# Patient Record
Sex: FEMALE | Race: WHITE | Employment: OTHER | URBAN - METROPOLITAN AREA
[De-identification: names, ages, dates, MRNs, and addresses within clinical notes are randomized per-mention and may not be internally consistent; named-entity substitution may affect disease eponyms.]

---

## 2017-01-23 DIAGNOSIS — R56.9 SEIZURE (HCC): Primary | ICD-10-CM

## 2017-01-23 NOTE — TELEPHONE ENCOUNTER
Medication: Topamax     Date of last refill: 10/27/16  [place]    Last office visit: 10/7/16  Due back to clinic per last office note: 6 months   Date next office visit scheduled: None scheduled     Last OV note recom

## 2017-01-25 RX ORDER — TOPIRAMATE 50 MG/1
TABLET, FILM COATED ORAL
Qty: 30 TABLET | Refills: 2 | Status: SHIPPED | OUTPATIENT
Start: 2017-01-25 | End: 2017-04-30

## 2017-03-02 ENCOUNTER — TELEPHONE (OUTPATIENT)
Dept: NEUROLOGY | Facility: CLINIC | Age: 52
End: 2017-03-02

## 2017-03-08 ENCOUNTER — OFFICE VISIT (OUTPATIENT)
Dept: NEUROLOGY | Facility: CLINIC | Age: 52
End: 2017-03-08

## 2017-03-08 VITALS
WEIGHT: 164 LBS | BODY MASS INDEX: 29 KG/M2 | SYSTOLIC BLOOD PRESSURE: 120 MMHG | RESPIRATION RATE: 16 BRPM | HEART RATE: 100 BPM | DIASTOLIC BLOOD PRESSURE: 72 MMHG

## 2017-03-08 DIAGNOSIS — G40.909 SEIZURE DISORDER (HCC): Primary | ICD-10-CM

## 2017-03-08 PROCEDURE — 99213 OFFICE O/P EST LOW 20 MIN: CPT | Performed by: OTHER

## 2017-03-08 RX ORDER — IBUPROFEN 200 MG
200 TABLET ORAL EVERY 6 HOURS PRN
COMMUNITY

## 2017-03-08 RX ORDER — DIVALPROEX SODIUM 125 MG/1
250 CAPSULE, COATED PELLETS ORAL 2 TIMES DAILY
COMMUNITY
Start: 2017-02-21 | End: 2017-05-26

## 2017-03-08 NOTE — PROGRESS NOTES
Pt here for seizure f/u. Reports no seizures since starting medication. Pt here to discuss next steps.

## 2017-03-08 NOTE — PATIENT INSTRUCTIONS
Refill policies:    • Allow 2 business days for refills; controlled substances may take longer.   • Contact your pharmacy at least 5 days prior to running out of medication and have them send an electronic request or submit request through the “request re your physician has recommended that you have a procedure or additional testing performed. [facility]) will contact your insurance carrier to obtain pre-certification or prior authorization.     Unfortunately, [facility] has seen an increas select Option #1 if you have any questions.   Location:  [facility]    [address]  (MOB 2) [address], [address] [facility]

## 2017-03-08 NOTE — PROGRESS NOTES
HPI:    Patient ID: René Mg is a 54year old female. HPI    Raiza Saleh is a 54year old female who presented for follow up for seizure. She had no seizure episodes after the first episode which occured about 2 years ago.  She has been on low dose D pleasant 54year old female   Head: Normocephalic and atraumatic. Neck: Normal range of motion. Neck supple. Cardiovascular: Normal rate, regular rhythm and normal heart sounds.     Pulmonary/Chest: Effort normal and breath sounds normal.   Abdominal: So

## 2017-04-26 DIAGNOSIS — R56.9 SEIZURE (HCC): Primary | ICD-10-CM

## 2017-04-27 RX ORDER — DIVALPROEX SODIUM 125 MG/1
CAPSULE, COATED PELLETS ORAL
Qty: 120 CAPSULE | Refills: 0 | Status: SHIPPED | OUTPATIENT
Start: 2017-04-27 | End: 2017-08-01 | Stop reason: SDUPTHER

## 2017-04-27 NOTE — TELEPHONE ENCOUNTER
Medication: Divalproex 125 mg sprinkles     Date of last refill: 2/21/17  [place]    Last office visit: 3/8/17  Due back to clinic per last office note: PRN  Date next office visit scheduled:  5/3/17    Last OV note r

## 2017-04-30 DIAGNOSIS — R56.9 SEIZURE (HCC): Primary | ICD-10-CM

## 2017-05-01 RX ORDER — TOPIRAMATE 50 MG/1
TABLET, FILM COATED ORAL
Qty: 30 TABLET | Refills: 5 | Status: SHIPPED | OUTPATIENT
Start: 2017-05-01 | End: 2017-11-04

## 2017-05-01 NOTE — TELEPHONE ENCOUNTER
Medication: Topamax    Date of last refill: 1/25/17 for #30/2 additional refills  [place]    Last office visit: 3/8/17  Due back to clinic per last office note:  PRN/1 year  Date next office visit scheduled:  not yet

## 2017-05-23 ENCOUNTER — NURSE ONLY (OUTPATIENT)
Dept: NEUROLOGY | Facility: CLINIC | Age: 52
End: 2017-05-23

## 2017-05-23 DIAGNOSIS — R56.9 SEIZURE (HCC): Primary | ICD-10-CM

## 2017-05-23 DIAGNOSIS — G40.909 SEIZURE DISORDER (HCC): Primary | ICD-10-CM

## 2017-05-23 PROCEDURE — 95819 EEG AWAKE AND ASLEEP: CPT | Performed by: OTHER

## 2017-05-23 NOTE — TELEPHONE ENCOUNTER
Per LOV, mentioned possibly tapering off Depakote. Had EEG today, 5/23/17. Will have to follow up with Dr. Althea Vasquez to if medication can be tapered and if so, will need instructions.  Also, 2 doses of Depakote came over from pharmacy, both for new and older

## 2017-05-26 ENCOUNTER — TELEPHONE (OUTPATIENT)
Dept: NEUROLOGY | Facility: CLINIC | Age: 52
End: 2017-05-26

## 2017-05-26 RX ORDER — DIVALPROEX SODIUM 125 MG/1
CAPSULE, COATED PELLETS ORAL
Qty: 120 CAPSULE | Refills: 1 | OUTPATIENT
Start: 2017-05-26

## 2017-05-26 RX ORDER — DIVALPROEX SODIUM 125 MG/1
CAPSULE, COATED PELLETS ORAL
Qty: 180 CAPSULE | Refills: 1 | Status: SHIPPED | OUTPATIENT
Start: 2017-05-26 | End: 2017-07-31

## 2017-05-26 NOTE — PROCEDURES
ELECTROENCEPHALOGRAM REPORT      Patient Name: Danish Zepeda  Chart ID: [de-identified]  Ordering Physician:   Dr Red Romero                 Date of Test: 5/23/2017    Patient Diagnosis: Seizure      55 Y/O FEMALE PRESENT FOR F/U  EEG FOR SEIZURE [facility] hyperventilation there was 1-2 occurrence of 3-4 HZ large sharp and slow wave activity.       IMPRESSION: This is abnormal EEG due to presence of bursts of generalized sharps and slow waves with bilateral frontal and temporal predominance suggestive of an u

## 2017-05-26 NOTE — TELEPHONE ENCOUNTER
Per Dr Julia Jha: Abnormal EEG . Patient to continue medications. Patient informed. She verbalized understanding.

## 2017-05-30 NOTE — TELEPHONE ENCOUNTER
Patient called back to clarify Depakote instructions. She verbalized understanding and has no further questions or concerns.

## 2017-05-30 NOTE — TELEPHONE ENCOUNTER
5/26/2017  4:23 PM Muqtadar, Lyndee Siren, MD Susan Les[facility] Nurse Rx Response         Comment: Since EEG was positive I change the dose for Depakote to 375 mg BID     Left detailed message for patient on confidential voice mail (OK per HIPAA) relaying message

## 2017-07-31 NOTE — TELEPHONE ENCOUNTER
Medication: Divalproex 125mg    Date of last refill: 5/26/17  [place]     Last office visit: 3/8/17  Due back to clinic per last office note:  prn  Date next office visit scheduled:  No appointment    Last OV note recomm

## 2017-08-01 RX ORDER — DIVALPROEX SODIUM 125 MG/1
CAPSULE, COATED PELLETS ORAL
Qty: 270 CAPSULE | Refills: 0 | Status: SHIPPED | OUTPATIENT
Start: 2017-08-01 | End: 2018-03-07 | Stop reason: ALTCHOICE

## 2017-08-04 ENCOUNTER — TELEPHONE (OUTPATIENT)
Dept: NEUROLOGY | Facility: CLINIC | Age: 52
End: 2017-08-04

## 2017-08-04 NOTE — TELEPHONE ENCOUNTER
Patient voicing concern about being on Depakote long term. Asking if she needs another EEG. Per 3/8/17 OV note per Dr Juanito Rodas:  Ramona Alcantara has been very stable. We will repeat EEG next month and if negative we will taper off Depakote.  5/26/17 EEG still abnor

## 2017-08-04 NOTE — TELEPHONE ENCOUNTER
Per LEEANNE Bowman: She can follow up with Dr. Dedrick Olvera in September or October, who can address her concerns at that time. She should stay on the Depakote for now, as her last EEG was abnormal. (Routing comment)     Patient notified of above.  She verbalized

## 2017-08-18 ENCOUNTER — OFFICE VISIT (OUTPATIENT)
Dept: NEUROLOGY | Facility: CLINIC | Age: 52
End: 2017-08-18

## 2017-08-18 VITALS
BODY MASS INDEX: 31.18 KG/M2 | WEIGHT: 176 LBS | RESPIRATION RATE: 16 BRPM | HEART RATE: 90 BPM | SYSTOLIC BLOOD PRESSURE: 108 MMHG | HEIGHT: 63 IN | DIASTOLIC BLOOD PRESSURE: 74 MMHG

## 2017-08-18 DIAGNOSIS — G40.909 SEIZURE DISORDER (HCC): Primary | ICD-10-CM

## 2017-08-18 PROCEDURE — 99213 OFFICE O/P EST LOW 20 MIN: CPT | Performed by: OTHER

## 2017-08-18 RX ORDER — DIVALPROEX SODIUM 125 MG/1
CAPSULE, COATED PELLETS ORAL
Qty: 270 CAPSULE | Refills: 0 | Status: CANCELLED | OUTPATIENT
Start: 2017-08-18

## 2017-08-18 NOTE — PATIENT INSTRUCTIONS
Refill policies:    • Allow 2-3 business days for refills; controlled substances may take longer.   • Contact your pharmacy at least 5 days prior to running out of medication and have them send an electronic request or submit request through the [facility] have a procedure or additional testing performed. [facility]) will contact your insurance carrier to obtain pre-certification or prior authorization.     Unfortunately, [facility] has seen an increase in denial of payment even though the p

## 2017-08-18 NOTE — PROGRESS NOTES
HPI:    Patient ID: Dottie Sanchez is a 54year old female. HPI      Ivone Giraldo is a 54year old female who presented for follow up for seizure. She had no seizure episodes after the first episode which occured about 2 years ago.  She has been on low dose PHYSICAL EXAM:   Physical Exam      Blood pressure 108/74, pulse 90, resp. rate 16, height 63\", weight 176 lb. Vitals reviewed. General: A pleasant 54year old female   Head: Normocephalic and atraumatic. Neck: Normal range of motion. Neck supple.

## 2017-09-14 DIAGNOSIS — R56.9 SEIZURE (HCC): Primary | ICD-10-CM

## 2017-09-15 ENCOUNTER — TELEPHONE (OUTPATIENT)
Dept: NEUROLOGY | Facility: CLINIC | Age: 52
End: 2017-09-15

## 2017-09-15 RX ORDER — DIVALPROEX SODIUM 125 MG/1
CAPSULE, COATED PELLETS ORAL
Qty: 180 CAPSULE | Refills: 1 | Status: SHIPPED | OUTPATIENT
Start: 2017-09-15 | End: 2017-10-31

## 2017-09-15 NOTE — TELEPHONE ENCOUNTER
Patient asking if she needs to fast for lab work. Advised fasting will provide accurate glucose level, but if chooses not to, to please let lab know. Patient verbalizes understanding.

## 2017-09-25 ENCOUNTER — APPOINTMENT (OUTPATIENT)
Dept: LAB | Age: 52
End: 2017-09-25
Attending: Other
Payer: COMMERCIAL

## 2017-09-25 LAB
ALBUMIN SERPL-MCNC: 3.7 G/DL (ref 3.5–4.8)
ALP LIVER SERPL-CCNC: 51 U/L (ref 39–100)
ALT SERPL-CCNC: 20 U/L (ref 14–54)
AST SERPL-CCNC: 11 U/L (ref 15–41)
BASOPHILS # BLD AUTO: 0.02 X10(3) UL (ref 0–0.1)
BASOPHILS NFR BLD AUTO: 0.4 %
BILIRUB SERPL-MCNC: 0.4 MG/DL (ref 0.1–2)
BUN BLD-MCNC: 13 MG/DL (ref 8–20)
CALCIUM BLD-MCNC: 9.5 MG/DL (ref 8.3–10.3)
CHLORIDE: 108 MMOL/L (ref 101–111)
CO2: 23 MMOL/L (ref 22–32)
CREAT BLD-MCNC: 0.81 MG/DL (ref 0.55–1.02)
EOSINOPHIL # BLD AUTO: 0.18 X10(3) UL (ref 0–0.3)
EOSINOPHIL NFR BLD AUTO: 3.7 %
ERYTHROCYTE [DISTWIDTH] IN BLOOD BY AUTOMATED COUNT: 13.5 % (ref 11.5–16)
GLUCOSE BLD-MCNC: 96 MG/DL (ref 70–99)
HCT VFR BLD AUTO: 36.1 % (ref 34–50)
HGB BLD-MCNC: 12 G/DL (ref 12–16)
IMMATURE GRANULOCYTE COUNT: 0.01 X10(3) UL (ref 0–1)
IMMATURE GRANULOCYTE RATIO %: 0.2 %
LYMPHOCYTES # BLD AUTO: 1.47 X10(3) UL (ref 0.9–4)
LYMPHOCYTES NFR BLD AUTO: 30.6 %
M PROTEIN MFR SERPL ELPH: 7.4 G/DL (ref 6.1–8.3)
MCH RBC QN AUTO: 29.3 PG (ref 27–33.2)
MCHC RBC AUTO-ENTMCNC: 33.2 G/DL (ref 31–37)
MCV RBC AUTO: 88 FL (ref 81–100)
MONOCYTES # BLD AUTO: 0.56 X10(3) UL (ref 0.1–0.6)
MONOCYTES NFR BLD AUTO: 11.6 %
NEUTROPHIL ABS PRELIM: 2.57 X10 (3) UL (ref 1.3–6.7)
NEUTROPHILS # BLD AUTO: 2.57 X10(3) UL (ref 1.3–6.7)
NEUTROPHILS NFR BLD AUTO: 53.5 %
PLATELET # BLD AUTO: 289 10(3)UL (ref 150–450)
POTASSIUM SERPL-SCNC: 4.2 MMOL/L (ref 3.6–5.1)
RBC # BLD AUTO: 4.1 X10(6)UL (ref 3.8–5.1)
RED CELL DISTRIBUTION WIDTH-SD: 43.4 FL (ref 35.1–46.3)
SODIUM SERPL-SCNC: 138 MMOL/L (ref 136–144)
WBC # BLD AUTO: 4.8 X10(3) UL (ref 4–13)

## 2017-09-25 PROCEDURE — 36415 COLL VENOUS BLD VENIPUNCTURE: CPT | Performed by: OTHER

## 2017-09-25 PROCEDURE — 80053 COMPREHEN METABOLIC PANEL: CPT | Performed by: OTHER

## 2017-09-25 PROCEDURE — 85025 COMPLETE CBC W/AUTO DIFF WBC: CPT | Performed by: OTHER

## 2017-09-26 ENCOUNTER — TELEPHONE (OUTPATIENT)
Dept: NEUROLOGY | Facility: CLINIC | Age: 52
End: 2017-09-26

## 2017-10-31 ENCOUNTER — OFFICE VISIT (OUTPATIENT)
Dept: NEUROLOGY | Facility: CLINIC | Age: 52
End: 2017-10-31

## 2017-10-31 VITALS
HEIGHT: 63 IN | BODY MASS INDEX: 31.71 KG/M2 | RESPIRATION RATE: 16 BRPM | WEIGHT: 179 LBS | SYSTOLIC BLOOD PRESSURE: 116 MMHG | DIASTOLIC BLOOD PRESSURE: 74 MMHG | HEART RATE: 96 BPM

## 2017-10-31 DIAGNOSIS — G40.909 SEIZURE DISORDER (HCC): Primary | ICD-10-CM

## 2017-10-31 PROCEDURE — 99213 OFFICE O/P EST LOW 20 MIN: CPT | Performed by: OTHER

## 2017-10-31 NOTE — PROGRESS NOTES
HPI:    Patient ID: Sharee Rodriguez is a 50year old female. Seizures   Pertinent negatives include no headaches. Pretty Alexander is a 54year old female who presented for follow up for seizure.  She had no seizure episodes after the first episode which o pleasant 54year old female   Head: Normocephalic and atraumatic. Neck: Normal range of motion. Neck supple. Cardiovascular: Normal rate, regular rhythm and normal heart sounds.     Pulmonary/Chest: Effort normal and breath sounds normal.   Abdominal: So

## 2017-10-31 NOTE — PATIENT INSTRUCTIONS
Depakote tapering instructions ( Depakote 125 mg capsule)      Take 2 capsules twice daily x 1 week    Then 1 capsule twice daily x 1 week    Then discontinued.  Call the clinic if you experience any suspicious symptoms

## 2017-11-04 DIAGNOSIS — R56.9 SEIZURE (HCC): ICD-10-CM

## 2017-11-06 RX ORDER — TOPIRAMATE 50 MG/1
TABLET, FILM COATED ORAL
Qty: 90 TABLET | Refills: 1 | Status: SHIPPED | OUTPATIENT
Start: 2017-11-06 | End: 2019-04-26

## 2018-03-07 ENCOUNTER — OFFICE VISIT (OUTPATIENT)
Dept: NEUROLOGY | Facility: CLINIC | Age: 53
End: 2018-03-07

## 2018-03-07 VITALS — HEART RATE: 96 BPM | SYSTOLIC BLOOD PRESSURE: 120 MMHG | DIASTOLIC BLOOD PRESSURE: 70 MMHG

## 2018-03-07 DIAGNOSIS — G43.009 MIGRAINE WITHOUT AURA AND WITHOUT STATUS MIGRAINOSUS, NOT INTRACTABLE: ICD-10-CM

## 2018-03-07 DIAGNOSIS — G40.909 SEIZURE DISORDER (HCC): Primary | ICD-10-CM

## 2018-03-07 PROCEDURE — 99213 OFFICE O/P EST LOW 20 MIN: CPT | Performed by: OTHER

## 2018-03-07 NOTE — PROGRESS NOTES
HPI:    Patient ID: Shawnee Brewer is a 50year old female. Seizures   Pertinent negatives include no headaches. Beti Godinez is a 50year old female who presented for follow up. She has history of seizure and migraines.  She clinically did not had an Physical Exam      Blood pressure 120/70, pulse 96. Vitals reviewed. General: A pleasant 54year old female   Head: Normocephalic and atraumatic. Neck: Normal range of motion. Neck supple.   Cardiovascular: Normal rate, regular rhythm and normal heart in this encounter    Imaging & Referrals:  None       YD#[id number]

## 2019-04-26 ENCOUNTER — HOSPITAL ENCOUNTER (OUTPATIENT)
Age: 54
Discharge: HOME | End: 2019-04-26
Attending: FAMILY MEDICINE
Payer: COMMERCIAL

## 2019-04-26 VITALS
RESPIRATION RATE: 16 BRPM | HEIGHT: 63 IN | OXYGEN SATURATION: 100 % | DIASTOLIC BLOOD PRESSURE: 89 MMHG | SYSTOLIC BLOOD PRESSURE: 109 MMHG | HEART RATE: 94 BPM | WEIGHT: 180 LBS | BODY MASS INDEX: 31.89 KG/M2 | TEMPERATURE: 97 F

## 2019-04-26 DIAGNOSIS — R82.90 ABNORMAL URINE FINDING: ICD-10-CM

## 2019-04-26 DIAGNOSIS — L50.8 VIRAL URTICARIA: ICD-10-CM

## 2019-04-26 DIAGNOSIS — A08.4 VIRAL GASTROENTERITIS: Primary | ICD-10-CM

## 2019-04-26 PROCEDURE — 87086 URINE CULTURE/COLONY COUNT: CPT | Performed by: FAMILY MEDICINE

## 2019-04-26 PROCEDURE — 81002 URINALYSIS NONAUTO W/O SCOPE: CPT | Performed by: FAMILY MEDICINE

## 2019-04-26 PROCEDURE — 99204 OFFICE O/P NEW MOD 45 MIN: CPT

## 2019-04-26 PROCEDURE — 85025 COMPLETE CBC W/AUTO DIFF WBC: CPT | Performed by: FAMILY MEDICINE

## 2019-04-26 PROCEDURE — 96360 HYDRATION IV INFUSION INIT: CPT

## 2019-04-26 PROCEDURE — 96361 HYDRATE IV INFUSION ADD-ON: CPT

## 2019-04-26 PROCEDURE — 99215 OFFICE O/P EST HI 40 MIN: CPT

## 2019-04-26 PROCEDURE — 80047 BASIC METABLC PNL IONIZED CA: CPT

## 2019-04-26 PROCEDURE — 80076 HEPATIC FUNCTION PANEL: CPT | Performed by: FAMILY MEDICINE

## 2019-04-26 PROCEDURE — 87081 CULTURE SCREEN ONLY: CPT | Performed by: FAMILY MEDICINE

## 2019-04-26 PROCEDURE — 87430 STREP A AG IA: CPT | Performed by: FAMILY MEDICINE

## 2019-04-26 RX ORDER — ONDANSETRON 4 MG/1
4 TABLET, ORALLY DISINTEGRATING ORAL ONCE
Status: COMPLETED | OUTPATIENT
Start: 2019-04-26 | End: 2019-04-26

## 2019-04-26 RX ORDER — ONDANSETRON 4 MG/1
4 TABLET, ORALLY DISINTEGRATING ORAL EVERY 6 HOURS PRN
Qty: 12 TABLET | Refills: 0 | Status: SHIPPED | OUTPATIENT
Start: 2019-04-26 | End: 2019-04-29

## 2019-04-26 RX ORDER — PREDNISONE 20 MG/1
20 TABLET ORAL DAILY
Qty: 5 TABLET | Refills: 0 | Status: SHIPPED | OUTPATIENT
Start: 2019-04-26 | End: 2019-05-01

## 2019-04-26 RX ORDER — SODIUM CHLORIDE 9 MG/ML
1000 INJECTION, SOLUTION INTRAVENOUS ONCE
Status: COMPLETED | OUTPATIENT
Start: 2019-04-26 | End: 2019-04-26

## 2019-04-26 RX ORDER — CIPROFLOXACIN 500 MG/1
500 TABLET, FILM COATED ORAL 2 TIMES DAILY
Qty: 14 TABLET | Refills: 0 | Status: SHIPPED | OUTPATIENT
Start: 2019-04-26 | End: 2019-05-03

## 2019-04-26 RX ORDER — POTASSIUM CHLORIDE 20 MEQ/1
40 TABLET, EXTENDED RELEASE ORAL ONCE
Status: COMPLETED | OUTPATIENT
Start: 2019-04-26 | End: 2019-04-26

## 2019-04-26 NOTE — ED INITIAL ASSESSMENT (HPI)
Pt sts n/v/d, chills/sweats began yesterday. Emesis x 1 yesterday- none since. Many episodes of diarrhea. Denies abd pain. Sts this morning noted rash to both arms and now has noticed on abd. Itching all over.  Denies SOB, swelling of lips, face, tongue, th

## 2019-04-26 NOTE — ED NOTES
Pt informed of hepatic function result and MD note. Sts has an appt with PMD for Tuesday 4/30/19. Sts will go to ED if symptoms worsen over the weekend.        Notes recorded by Artemio Greer MD on 4/26/2019 at 3:37 PM CDT  Elevated liver enzy

## 2019-04-26 NOTE — ED PROVIDER NOTES
Patient Seen in: [facility] [place]    History   Patient presents with:  Nausea/Vomiting/Diarrhea (gastrointestinal)  Rash Skin Problem (integumentary)    Stated Complaint: TL - Diarrhea    HPI  49 yo F here with complaints of nausea - vomit atraumatic  EENT: OP - wnl, moist, Nares normal  NECK: FROM, supple  LUNGS: CTAB, no RRW  CV: RRR  ABD: hyperactive BS+, no HSM, no guarding or rebound, not distended  NEURO: Alert and oriented to person place and time  GAIT: Normal          ED Course hours as needed. Dispense:  12 tablet          Refill:  0      predniSONE 20 MG Oral Tab          Sig: Take 1 tablet (20 mg total) by mouth daily for 5 days.           Dispense:  5 tablet          Refill:  0      Ciprofloxacin HCl 500 MG Oral Tab

## 2019-05-03 ENCOUNTER — EXTERNAL RECORD (OUTPATIENT)
Dept: OTHER | Age: 54
End: 2019-05-03

## 2019-05-16 ENCOUNTER — TELEPHONE (OUTPATIENT)
Dept: RHEUMATOLOGY | Age: 54
End: 2019-05-16

## 2019-08-10 ENCOUNTER — HOSPITAL ENCOUNTER (OUTPATIENT)
Age: 54
Discharge: HOME | End: 2019-08-10
Attending: FAMILY MEDICINE
Payer: COMMERCIAL

## 2019-08-10 VITALS
OXYGEN SATURATION: 97 % | RESPIRATION RATE: 18 BRPM | SYSTOLIC BLOOD PRESSURE: 126 MMHG | TEMPERATURE: 98 F | DIASTOLIC BLOOD PRESSURE: 84 MMHG | HEART RATE: 118 BPM

## 2019-08-10 DIAGNOSIS — J20.9 ACUTE BRONCHITIS, UNSPECIFIED ORGANISM: Primary | ICD-10-CM

## 2019-08-10 PROCEDURE — 99213 OFFICE O/P EST LOW 20 MIN: CPT

## 2019-08-10 PROCEDURE — 99214 OFFICE O/P EST MOD 30 MIN: CPT

## 2019-08-10 RX ORDER — CEFDINIR 250 MG/5ML
300 POWDER, FOR SUSPENSION ORAL 2 TIMES DAILY
Qty: 84 ML | Refills: 0 | Status: SHIPPED | OUTPATIENT
Start: 2019-08-10 | End: 2019-08-17

## 2019-08-10 RX ORDER — PREDNISONE 10 MG/1
10 TABLET ORAL
COMMUNITY
End: 2021-11-17

## 2019-08-10 RX ORDER — HYDROXYCHLOROQUINE SULFATE 200 MG/1
TABLET, FILM COATED ORAL
Refills: 3 | COMMUNITY
Start: 2019-07-18

## 2019-08-10 RX ORDER — PANTOPRAZOLE SODIUM 40 MG/1
40 TABLET, DELAYED RELEASE ORAL
COMMUNITY
Start: 2019-06-07

## 2019-08-10 NOTE — ED PROVIDER NOTES
[place] [address] [facility] [place]   Patient presents with:  Cough/URI    Stated Complaint: Sore Throat, congestion    HPI    32-year-old  female presents to the immediate care today with chest congestion, cough, phlegm.   Charlie Aldridge gums normal  Neck: no adenopathy, supple, no bruits  Chest wall: No rib tenderness. No crepitus. Lungs: rhonchi heard in both lung fields. No wheezing  or crackles. No accessory muscle use. No respiratory distress. No tachypnea noted.  No retractions not

## 2019-08-28 ENCOUNTER — HOSPITAL ENCOUNTER (OUTPATIENT)
Age: 54
Discharge: HOME | End: 2019-08-28
Attending: FAMILY MEDICINE
Payer: COMMERCIAL

## 2019-08-28 VITALS
HEART RATE: 103 BPM | RESPIRATION RATE: 16 BRPM | TEMPERATURE: 99 F | SYSTOLIC BLOOD PRESSURE: 121 MMHG | DIASTOLIC BLOOD PRESSURE: 78 MMHG | OXYGEN SATURATION: 98 %

## 2019-08-28 DIAGNOSIS — J40 BRONCHITIS: ICD-10-CM

## 2019-08-28 DIAGNOSIS — J45.909 MILD REACTIVE AIRWAYS DISEASE, UNSPECIFIED WHETHER PERSISTENT: ICD-10-CM

## 2019-08-28 DIAGNOSIS — J01.00 ACUTE MAXILLARY SINUSITIS, RECURRENCE NOT SPECIFIED: Primary | ICD-10-CM

## 2019-08-28 PROCEDURE — 94664 DEMO&/EVAL PT USE INHALER: CPT

## 2019-08-28 PROCEDURE — 99213 OFFICE O/P EST LOW 20 MIN: CPT

## 2019-08-28 PROCEDURE — 99214 OFFICE O/P EST MOD 30 MIN: CPT

## 2019-08-28 RX ORDER — ALBUTEROL SULFATE 90 UG/1
2 AEROSOL, METERED RESPIRATORY (INHALATION) EVERY 4 HOURS PRN
Qty: 1 INHALER | Refills: 0 | Status: SHIPPED | OUTPATIENT
Start: 2019-08-28 | End: 2019-09-27

## 2019-08-28 RX ORDER — CEFDINIR 250 MG/5ML
300 POWDER, FOR SUSPENSION ORAL 2 TIMES DAILY
Qty: 120 ML | Refills: 0 | Status: SHIPPED | OUTPATIENT
Start: 2019-08-28 | End: 2019-09-07

## 2019-08-28 NOTE — ED PROVIDER NOTES
Patient Seen in: [address] [facility] [place]    History   Patient presents with:  Cough/URI  Sore Throat    Stated Complaint: cough    HPI  This is a 24-year-old female coming in with complaints of cough, nasal congestion and sore throat that began s distress, making good conversation, answering appropriately   SKIN: No pallor, no erythema, no cyanosis, warm and dry  Eyes: wnl, normal conjunctiva   HEAD: Normocephalic, atraumatic  EENT: OP - wnl, moist, erythematous OP, TMs - middle ear effusion+, Nare are using your albuterol inhaler more than prescribed or not relieving shortness of breath or wheezing, please go to the emergency room and contact your primary care physician.           Disposition and Plan     Clinical Impression:  Acute maxillary sinusit

## 2019-08-28 NOTE — ED INITIAL ASSESSMENT (HPI)
Pt sts cough, nasal congestion, and sore throat began several days ago. Feeling tired. No known fever. Cough is productive at times. Denies SOB. Sts seen here 2 weeks ago- symptoms completely resolved.

## 2019-11-18 ENCOUNTER — APPOINTMENT (OUTPATIENT)
Dept: DERMATOLOGY | Age: 54
End: 2019-11-18

## 2019-11-25 ENCOUNTER — OFFICE VISIT (OUTPATIENT)
Dept: DERMATOLOGY | Age: 54
End: 2019-11-25

## 2019-11-25 DIAGNOSIS — Z12.83 SCREENING EXAM FOR SKIN CANCER: Primary | ICD-10-CM

## 2019-11-25 DIAGNOSIS — L82.1 SEBORRHEIC KERATOSIS: ICD-10-CM

## 2019-11-25 DIAGNOSIS — D48.9 NEOPLASM OF UNCERTAIN BEHAVIOR: ICD-10-CM

## 2019-11-25 PROCEDURE — 99203 OFFICE O/P NEW LOW 30 MIN: CPT | Performed by: PHYSICIAN ASSISTANT

## 2019-11-25 PROCEDURE — 11102 TANGNTL BX SKIN SINGLE LES: CPT | Performed by: PHYSICIAN ASSISTANT

## 2019-11-25 RX ORDER — IBUPROFEN 200 MG
200 TABLET ORAL
COMMUNITY

## 2019-11-25 RX ORDER — HYDROXYCHLOROQUINE SULFATE 200 MG/1
400 TABLET, FILM COATED ORAL
COMMUNITY
Start: 2019-07-18

## 2019-11-25 RX ORDER — PANTOPRAZOLE SODIUM 40 MG/1
40 TABLET, DELAYED RELEASE ORAL
COMMUNITY
Start: 2019-06-07

## 2019-11-25 RX ORDER — PREDNISONE 10 MG/1
10 TABLET ORAL
COMMUNITY

## 2019-11-27 LAB — PATH REPORT PLASRBC-IMP: NORMAL

## 2021-09-15 ENCOUNTER — LAB SERVICES (OUTPATIENT)
Dept: URGENT CARE | Age: 56
End: 2021-09-15

## 2021-09-15 DIAGNOSIS — Z11.52 ENCOUNTER FOR SCREENING LABORATORY TESTING FOR COVID-19 VIRUS IN ASYMPTOMATIC PATIENT: Primary | ICD-10-CM

## 2021-09-15 DIAGNOSIS — Z01.812 ENCOUNTER FOR SCREENING LABORATORY TESTING FOR COVID-19 VIRUS IN ASYMPTOMATIC PATIENT: Primary | ICD-10-CM

## 2021-09-15 PROCEDURE — U0003 INFECTIOUS AGENT DETECTION BY NUCLEIC ACID (DNA OR RNA); SEVERE ACUTE RESPIRATORY SYNDROME CORONAVIRUS 2 (SARS-COV-2) (CORONAVIRUS DISEASE [COVID-19]), AMPLIFIED PROBE TECHNIQUE, MAKING USE OF HIGH THROUGHPUT TECHNOLOGIES AS DESCRIBED BY CMS-2020-01-R: HCPCS | Performed by: INTERNAL MEDICINE

## 2021-09-15 PROCEDURE — U0005 INFEC AGEN DETEC AMPLI PROBE: HCPCS | Performed by: INTERNAL MEDICINE

## 2021-09-15 PROCEDURE — X1094 NO CHARGE VISIT: HCPCS | Performed by: NURSE PRACTITIONER

## 2021-09-16 LAB
SARS-COV-2 RNA RESP QL NAA+PROBE: NOT DETECTED
SERVICE CMNT-IMP: NORMAL
SERVICE CMNT-IMP: NORMAL

## 2021-10-18 ENCOUNTER — HOSPITAL ENCOUNTER (OUTPATIENT)
Age: 56
Discharge: HOME | End: 2021-10-18
Payer: COMMERCIAL

## 2021-10-18 VITALS
SYSTOLIC BLOOD PRESSURE: 103 MMHG | TEMPERATURE: 98 F | DIASTOLIC BLOOD PRESSURE: 58 MMHG | OXYGEN SATURATION: 98 % | HEART RATE: 73 BPM | RESPIRATION RATE: 16 BRPM

## 2021-10-18 DIAGNOSIS — J06.9 VIRAL URI: Primary | ICD-10-CM

## 2021-10-18 PROCEDURE — U0002 COVID-19 LAB TEST NON-CDC: HCPCS | Performed by: NURSE PRACTITIONER

## 2021-10-18 PROCEDURE — 99213 OFFICE O/P EST LOW 20 MIN: CPT | Performed by: NURSE PRACTITIONER

## 2021-10-18 PROCEDURE — 87880 STREP A ASSAY W/OPTIC: CPT | Performed by: NURSE PRACTITIONER

## 2021-10-18 NOTE — ED PROVIDER NOTES
Patient Seen in: Immediate [address] [place] [address]      History   Patient presents with:  Testing    Stated Complaint: ear pain sore throat    Subjective: This is a 61-year-old female with below stated medical history.   Presents to immediate care for sore throat, Neurological: Negative for headaches. Hematological: Does not bruise/bleed easily. Positive for stated complaint: ear pain sore throat  Other systems are as noted in HPI. Constitutional and vital signs reviewed.       All other systems reviewed a Musculoskeletal:      Cervical back: Neck supple. Right lower leg: No edema. Left lower leg: No edema. Skin:     Capillary Refill: Capillary refill takes less than 2 seconds. Findings: No rash.    Neurological:      Mental Status: She is

## 2021-11-17 ENCOUNTER — OFFICE VISIT (OUTPATIENT)
Dept: NEUROLOGY | Facility: CLINIC | Age: 56
End: 2021-11-17
Payer: COMMERCIAL

## 2021-11-17 VITALS
OXYGEN SATURATION: 99 % | SYSTOLIC BLOOD PRESSURE: 98 MMHG | BODY MASS INDEX: 29.06 KG/M2 | WEIGHT: 164 LBS | RESPIRATION RATE: 16 BRPM | DIASTOLIC BLOOD PRESSURE: 66 MMHG | HEART RATE: 80 BPM | HEIGHT: 63 IN

## 2021-11-17 DIAGNOSIS — R56.9 SEIZURES (HCC): Primary | ICD-10-CM

## 2021-11-17 PROCEDURE — 99203 OFFICE O/P NEW LOW 30 MIN: CPT | Performed by: HOSPITALIST

## 2021-11-17 PROCEDURE — 3008F BODY MASS INDEX DOCD: CPT | Performed by: HOSPITALIST

## 2021-11-17 PROCEDURE — 3074F SYST BP LT 130 MM HG: CPT | Performed by: HOSPITALIST

## 2021-11-17 PROCEDURE — 3078F DIAST BP <80 MM HG: CPT | Performed by: HOSPITALIST

## 2021-11-17 RX ORDER — HYDROXYZINE HYDROCHLORIDE 25 MG/1
TABLET, FILM COATED ORAL
COMMUNITY
Start: 2021-10-07

## 2021-11-17 RX ORDER — RIBOFLAVIN (VITAMIN B2) 400 MG
400 TABLET ORAL DAILY
Qty: 60 TABLET | Refills: 3 | Status: SHIPPED | OUTPATIENT
Start: 2021-11-17 | End: 2021-12-17

## 2021-11-17 RX ORDER — SERTRALINE HYDROCHLORIDE 25 MG/1
25 TABLET, FILM COATED ORAL DAILY
COMMUNITY
Start: 2021-10-07

## 2021-11-17 NOTE — PROGRESS NOTES
New patient for Seizures- Patient states she had one seizure in 2015 but has been seizure free since then. Patient is not currently on any seizure medications.

## 2021-11-17 NOTE — H&P
Neurology H&P    Danish Zepeda Patient Status:  No patient class for patient encounter    1965 MRN [id number]   Location ED [facility] Attending No att. providers found   Hosp Day # 0 PCP PHILIP Segura     Subjective:  Clelia Soulier 15 mg by mouth once a week. • hydrOXYzine 25 MG Oral Tab Take 1 tablet as needed for anxiousness and inability to sleep     • Riboflavin 400 MG Oral Tab Take 400 mg by mouth daily.  60 tablet 3   • Pantoprazole Sodium 40 MG Oral Tab EC Take 40 mg by medhat point ROS completed and was negative, except for pertinent positive and negatives stated in subjective. Objective/Physical Exam:    Vital Signs:  Blood pressure 98/66, pulse 80, resp. rate 16, height 63\", weight 164 lb (74.4 kg), SpO2 99 %.     Gen: Anamaria on above      Alvarenga MD Howard

## 2022-03-18 ENCOUNTER — NURSE ONLY (OUTPATIENT)
Dept: ELECTROPHYSIOLOGY | Facility: HOSPITAL | Age: 57
End: 2022-03-18
Attending: HOSPITALIST
Payer: COMMERCIAL

## 2022-03-18 DIAGNOSIS — R56.9 SEIZURES (HCC): ICD-10-CM

## 2022-03-18 DIAGNOSIS — R41.82 ALTERED MENTAL STATUS, UNSPECIFIED ALTERED MENTAL STATUS TYPE: ICD-10-CM

## 2022-03-18 PROCEDURE — 95819 EEG AWAKE AND ASLEEP: CPT | Performed by: HOSPITALIST

## 2022-03-31 ENCOUNTER — TELEPHONE (OUTPATIENT)
Dept: NEUROLOGY | Facility: CLINIC | Age: 57
End: 2022-03-31

## 2022-03-31 NOTE — TELEPHONE ENCOUNTER
Patient would like to know if her test results have been read by Provider yet. Please contact to further discuss and advise.

## 2022-03-31 NOTE — PROCEDURES
[facility] - ELECTROENCEPHALOGRAM (EEG) REPORT  Patient Name:  Louis Reyes   MRN / CSN:  [de-identified] / [id number]   Date of Birth / Age:  8/19/1965 /  49year old   Encounter Date:  3/18/2022         METHODS:  Twenty-two electrodes were applied according to the 10-20-electrode placement system on this routine audio-video EEG. EKG monitoring, monopolar and bipolar montages are routinely utilized. The record was obtained on a digital system. OBJECT:  This is a 49year old year-old female with history of epilepsy    The EEG was requested to assess for epileptiform activity and change in mental status. State(s) of consciousness: Awake, drowsy, sleep    Relevant medications:   sertraline 25 MG Oral Tab, Take 25 mg by mouth daily. , Disp: , Rfl:   methotrexate 2.5 MG Oral Tab, Take 15 mg by mouth once a week., Disp: , Rfl:   hydrOXYzine 25 MG Oral Tab, Take 1 tablet as needed for anxiousness and inability to sleep, Disp: , Rfl:   Pantoprazole Sodium 40 MG Oral Tab EC, Take 40 mg by mouth., Disp: , Rfl:   metoprolol Tartrate 25 MG Oral Tab, Take 12.5 mg by mouth., Disp: , Rfl:   Hydroxychloroquine Sulfate 200 MG Oral Tab, TK 2 TS PO ONCE A DAY, Disp: , Rfl: 3  cetirizine 10 MG Oral Tab, TK 1 T PO QD, Disp: , Rfl: 0  triamcinolone acetonide 0.1 % External Ointment, Apply to arms and legs bid, Disp: 453 g, Rfl: 1  hydrocortisone 2.5 % External Cream, Apply 1 Application topically 2 (two) times daily. Apply every morning and evening. Apply to face, Disp: 60 g, Rfl: 1  ibuprofen 200 MG Oral Tab, Take 200 mg by mouth every 6 (six) hours as needed for Pain., Disp: , Rfl:   Cholecalciferol (VITAMIN D) 2000 UNITS Oral Cap, Take  by mouth., Disp: , Rfl:     No current facility-administered medications on file prior to visit. FINDINGS:  During relative maximal wakefulness there was a well-developed bilateral posterior dominant rhythm between 9.5 to 10.5 Hz (50-70 uV) that appeared symmetric and reactive.   General background in this state largely consisted of alpha frequencies with an AP gradient. Transition to drowsiness and sleep characterized by symmetric background slowing. During sleep spike and  spike and wave discharges were seen on occasion bifrontally. No clear ictal events captured. Background was reactive throughout the recording. Hyperventilation was performed. Did see a burst of broad bifrontal spike discharges during hyperventilation. IMPRESSION:  This was an abnormal routine EEG in what appeared to be the awake, drowsy, and asleep states. Findings are consistent with a generalized seizure tendency. No clear seizures captured.       SIGNATURES:  Jude Espinoza MD   [facility]

## 2022-04-14 ENCOUNTER — HOSPITAL ENCOUNTER (EMERGENCY)
Age: 57
Discharge: HOME | End: 2022-04-14
Attending: EMERGENCY MEDICINE
Payer: COMMERCIAL

## 2022-04-14 VITALS
HEART RATE: 98 BPM | SYSTOLIC BLOOD PRESSURE: 146 MMHG | HEIGHT: 63 IN | RESPIRATION RATE: 14 BRPM | BODY MASS INDEX: 30.12 KG/M2 | DIASTOLIC BLOOD PRESSURE: 73 MMHG | OXYGEN SATURATION: 97 % | TEMPERATURE: 98 F | WEIGHT: 170 LBS

## 2022-04-14 DIAGNOSIS — R51.9 NONINTRACTABLE EPISODIC HEADACHE, UNSPECIFIED HEADACHE TYPE: Primary | ICD-10-CM

## 2022-04-14 LAB
ANION GAP SERPL CALC-SCNC: 7 MMOL/L (ref 0–18)
BASOPHILS # BLD AUTO: 0.02 X10(3) UL (ref 0–0.2)
BASOPHILS NFR BLD AUTO: 0.4 %
BUN BLD-MCNC: 14 MG/DL (ref 7–18)
CALCIUM BLD-MCNC: 8.9 MG/DL (ref 8.5–10.1)
CHLORIDE SERPL-SCNC: 106 MMOL/L (ref 98–112)
CO2 SERPL-SCNC: 25 MMOL/L (ref 21–32)
CREAT BLD-MCNC: 0.88 MG/DL
EOSINOPHIL # BLD AUTO: 0.18 X10(3) UL (ref 0–0.7)
EOSINOPHIL NFR BLD AUTO: 3.7 %
ERYTHROCYTE [DISTWIDTH] IN BLOOD BY AUTOMATED COUNT: 14.4 %
GLUCOSE BLD-MCNC: 121 MG/DL (ref 70–99)
HCT VFR BLD AUTO: 35.6 %
HGB BLD-MCNC: 11.5 G/DL
IMM GRANULOCYTES # BLD AUTO: 0.01 X10(3) UL (ref 0–1)
IMM GRANULOCYTES NFR BLD: 0.2 %
LYMPHOCYTES # BLD AUTO: 1.55 X10(3) UL (ref 1–4)
LYMPHOCYTES NFR BLD AUTO: 31.4 %
MCH RBC QN AUTO: 28.7 PG (ref 26–34)
MCHC RBC AUTO-ENTMCNC: 32.3 G/DL (ref 31–37)
MCV RBC AUTO: 88.8 FL
MONOCYTES # BLD AUTO: 0.5 X10(3) UL (ref 0.1–1)
MONOCYTES NFR BLD AUTO: 10.1 %
NEUTROPHILS # BLD AUTO: 2.67 X10 (3) UL (ref 1.5–7.7)
NEUTROPHILS # BLD AUTO: 2.67 X10(3) UL (ref 1.5–7.7)
NEUTROPHILS NFR BLD AUTO: 54.2 %
OSMOLALITY SERPL CALC.SUM OF ELEC: 288 MOSM/KG (ref 275–295)
PLATELET # BLD AUTO: 311 10(3)UL (ref 150–450)
POTASSIUM SERPL-SCNC: 3.9 MMOL/L (ref 3.5–5.1)
RBC # BLD AUTO: 4.01 X10(6)UL
SODIUM SERPL-SCNC: 138 MMOL/L (ref 136–145)
WBC # BLD AUTO: 4.9 X10(3) UL (ref 4–11)

## 2022-04-14 PROCEDURE — 96360 HYDRATION IV INFUSION INIT: CPT

## 2022-04-14 PROCEDURE — 85025 COMPLETE CBC W/AUTO DIFF WBC: CPT | Performed by: EMERGENCY MEDICINE

## 2022-04-14 PROCEDURE — 99284 EMERGENCY DEPT VISIT MOD MDM: CPT

## 2022-04-14 PROCEDURE — 80048 BASIC METABOLIC PNL TOTAL CA: CPT | Performed by: EMERGENCY MEDICINE

## 2022-04-14 NOTE — ED INITIAL ASSESSMENT (HPI)
PT to the ED for evaluation of intermittent headaches for 2 months, constant since last night. Pt states she took one 325mg dose of tylenol yesterday, but got no relief. PT states that in 2015 she had a seizure \"from stress\" and she wants to be sure that this headache isn't a sign that she is going to have another seizure. PT denies diagnosis of seizure disoder and has not been on depakote since 2018.

## 2022-04-18 ENCOUNTER — HOSPITAL ENCOUNTER (EMERGENCY)
Facility: HOSPITAL | Age: 57
Discharge: HOME | End: 2022-04-18
Attending: EMERGENCY MEDICINE
Payer: COMMERCIAL

## 2022-04-18 VITALS
RESPIRATION RATE: 16 BRPM | WEIGHT: 170 LBS | OXYGEN SATURATION: 98 % | HEART RATE: 107 BPM | HEIGHT: 63 IN | DIASTOLIC BLOOD PRESSURE: 76 MMHG | SYSTOLIC BLOOD PRESSURE: 110 MMHG | BODY MASS INDEX: 30.12 KG/M2 | TEMPERATURE: 98 F

## 2022-04-18 DIAGNOSIS — R51.9 RECURRENT HEADACHE: Primary | ICD-10-CM

## 2022-04-18 LAB — CARBAMAZEPINE SERPL-MCNC: 3.2 UG/ML (ref 4–12)

## 2022-04-18 PROCEDURE — 96375 TX/PRO/DX INJ NEW DRUG ADDON: CPT

## 2022-04-18 PROCEDURE — 99284 EMERGENCY DEPT VISIT MOD MDM: CPT

## 2022-04-18 PROCEDURE — 96374 THER/PROPH/DIAG INJ IV PUSH: CPT

## 2022-04-18 PROCEDURE — 80156 ASSAY CARBAMAZEPINE TOTAL: CPT | Performed by: EMERGENCY MEDICINE

## 2022-04-18 RX ORDER — DIPHENHYDRAMINE HYDROCHLORIDE 50 MG/ML
25 INJECTION INTRAMUSCULAR; INTRAVENOUS ONCE
Status: DISCONTINUED | OUTPATIENT
Start: 2022-04-18 | End: 2022-04-18

## 2022-04-18 RX ORDER — KETOROLAC TROMETHAMINE 30 MG/ML
15 INJECTION, SOLUTION INTRAMUSCULAR; INTRAVENOUS ONCE
Status: COMPLETED | OUTPATIENT
Start: 2022-04-18 | End: 2022-04-18

## 2022-04-18 RX ORDER — METOCLOPRAMIDE HYDROCHLORIDE 5 MG/ML
10 INJECTION INTRAMUSCULAR; INTRAVENOUS ONCE
Status: COMPLETED | OUTPATIENT
Start: 2022-04-18 | End: 2022-04-18

## 2022-04-18 RX ORDER — DIPHENHYDRAMINE HYDROCHLORIDE 50 MG/ML
25 INJECTION INTRAMUSCULAR; INTRAVENOUS ONCE
Status: COMPLETED | OUTPATIENT
Start: 2022-04-18 | End: 2022-04-18

## 2022-04-18 RX ORDER — DEXAMETHASONE SODIUM PHOSPHATE 10 MG/ML
10 INJECTION, SOLUTION INTRAMUSCULAR; INTRAVENOUS ONCE
Status: COMPLETED | OUTPATIENT
Start: 2022-04-18 | End: 2022-04-18

## 2022-04-18 NOTE — ED INITIAL ASSESSMENT (HPI)
Pt is awake and alert, reports being started on carbmezipine Thursday, has \"crying\" seizures, with abnormal seizure test, has had headache at top of head for 1 week, no releif from motrin,

## 2022-07-06 ENCOUNTER — OFFICE VISIT (OUTPATIENT)
Dept: NEUROLOGY | Facility: CLINIC | Age: 57
End: 2022-07-06
Payer: COMMERCIAL

## 2022-07-06 VITALS
DIASTOLIC BLOOD PRESSURE: 70 MMHG | BODY MASS INDEX: 30.12 KG/M2 | OXYGEN SATURATION: 97 % | RESPIRATION RATE: 16 BRPM | SYSTOLIC BLOOD PRESSURE: 100 MMHG | WEIGHT: 170 LBS | HEART RATE: 109 BPM | HEIGHT: 63 IN

## 2022-07-06 DIAGNOSIS — R56.9 SEIZURES (HCC): Primary | ICD-10-CM

## 2022-07-06 PROCEDURE — 3074F SYST BP LT 130 MM HG: CPT | Performed by: HOSPITALIST

## 2022-07-06 PROCEDURE — 3008F BODY MASS INDEX DOCD: CPT | Performed by: HOSPITALIST

## 2022-07-06 PROCEDURE — 99214 OFFICE O/P EST MOD 30 MIN: CPT | Performed by: HOSPITALIST

## 2022-07-06 PROCEDURE — 3078F DIAST BP <80 MM HG: CPT | Performed by: HOSPITALIST

## 2022-07-06 RX ORDER — CARBAMAZEPINE 100 MG/1
TABLET, EXTENDED RELEASE ORAL
Qty: 180 TABLET | Refills: 11 | Status: SHIPPED | OUTPATIENT
Start: 2022-07-06

## 2022-07-31 ENCOUNTER — HOSPITAL ENCOUNTER (OUTPATIENT)
Age: 57
Discharge: HOME | End: 2022-07-31
Payer: COMMERCIAL

## 2022-07-31 VITALS
HEIGHT: 63 IN | DIASTOLIC BLOOD PRESSURE: 74 MMHG | RESPIRATION RATE: 20 BRPM | BODY MASS INDEX: 30.12 KG/M2 | SYSTOLIC BLOOD PRESSURE: 122 MMHG | WEIGHT: 170 LBS | OXYGEN SATURATION: 98 % | HEART RATE: 107 BPM | TEMPERATURE: 98 F

## 2022-07-31 DIAGNOSIS — H10.31 ACUTE CONJUNCTIVITIS OF RIGHT EYE, UNSPECIFIED ACUTE CONJUNCTIVITIS TYPE: Primary | ICD-10-CM

## 2022-07-31 DIAGNOSIS — L08.9 FOOT INFECTION: ICD-10-CM

## 2022-07-31 PROCEDURE — 99213 OFFICE O/P EST LOW 20 MIN: CPT | Performed by: NURSE PRACTITIONER

## 2022-07-31 RX ORDER — CEPHALEXIN 500 MG/1
500 CAPSULE ORAL 3 TIMES DAILY
Qty: 30 CAPSULE | Refills: 0 | Status: SHIPPED | OUTPATIENT
Start: 2022-07-31 | End: 2022-08-10

## 2022-07-31 RX ORDER — POLYMYXIN B SULFATE AND TRIMETHOPRIM 1; 10000 MG/ML; [USP'U]/ML
1 SOLUTION OPHTHALMIC
Qty: 10 ML | Refills: 0 | Status: SHIPPED | OUTPATIENT
Start: 2022-07-31 | End: 2022-08-05

## 2022-07-31 RX ORDER — CLOTRIMAZOLE 1 %
1 CREAM (GRAM) TOPICAL 2 TIMES DAILY
Qty: 40 G | Refills: 0 | Status: SHIPPED | OUTPATIENT
Start: 2022-07-31 | End: 2022-08-14

## 2022-07-31 NOTE — ED INITIAL ASSESSMENT (HPI)
Pt with c/o right eye redness, discharge and itching that started yesterday. Also c/o rash to foot x1 week.   Pt using cortisone without relief

## 2022-12-20 ENCOUNTER — HOSPITAL ENCOUNTER (OUTPATIENT)
Age: 57
Discharge: HOME | End: 2022-12-20
Payer: COMMERCIAL

## 2022-12-20 VITALS
DIASTOLIC BLOOD PRESSURE: 85 MMHG | HEIGHT: 63 IN | HEART RATE: 105 BPM | TEMPERATURE: 99 F | BODY MASS INDEX: 30.12 KG/M2 | RESPIRATION RATE: 18 BRPM | SYSTOLIC BLOOD PRESSURE: 121 MMHG | WEIGHT: 170 LBS | OXYGEN SATURATION: 96 %

## 2022-12-20 DIAGNOSIS — B34.9 VIRAL SYNDROME: Primary | ICD-10-CM

## 2022-12-20 LAB
S PYO AG THROAT QL: NEGATIVE
SARS-COV-2 RNA RESP QL NAA+PROBE: NOT DETECTED

## 2022-12-20 PROCEDURE — 99203 OFFICE O/P NEW LOW 30 MIN: CPT | Performed by: PHYSICIAN ASSISTANT

## 2022-12-20 PROCEDURE — U0002 COVID-19 LAB TEST NON-CDC: HCPCS | Performed by: NURSE PRACTITIONER

## 2022-12-20 PROCEDURE — 87880 STREP A ASSAY W/OPTIC: CPT | Performed by: PHYSICIAN ASSISTANT

## 2022-12-28 ENCOUNTER — HOSPITAL ENCOUNTER (OUTPATIENT)
Age: 57
Discharge: HOME | End: 2022-12-28
Payer: COMMERCIAL

## 2022-12-28 VITALS
RESPIRATION RATE: 16 BRPM | HEART RATE: 99 BPM | OXYGEN SATURATION: 98 % | DIASTOLIC BLOOD PRESSURE: 91 MMHG | TEMPERATURE: 98 F | SYSTOLIC BLOOD PRESSURE: 126 MMHG

## 2022-12-28 DIAGNOSIS — J01.00 ACUTE NON-RECURRENT MAXILLARY SINUSITIS: Primary | ICD-10-CM

## 2022-12-28 PROCEDURE — 99213 OFFICE O/P EST LOW 20 MIN: CPT | Performed by: PHYSICIAN ASSISTANT

## 2023-06-11 ENCOUNTER — HOSPITAL ENCOUNTER (OUTPATIENT)
Age: 58
Discharge: HOME | End: 2023-06-11
Payer: COMMERCIAL

## 2023-06-11 VITALS
BODY MASS INDEX: 31.36 KG/M2 | RESPIRATION RATE: 18 BRPM | SYSTOLIC BLOOD PRESSURE: 118 MMHG | WEIGHT: 177 LBS | TEMPERATURE: 98 F | OXYGEN SATURATION: 98 % | HEART RATE: 99 BPM | HEIGHT: 63 IN | DIASTOLIC BLOOD PRESSURE: 74 MMHG

## 2023-06-11 DIAGNOSIS — H10.32 ACUTE CONJUNCTIVITIS OF LEFT EYE, UNSPECIFIED ACUTE CONJUNCTIVITIS TYPE: Primary | ICD-10-CM

## 2023-06-11 PROCEDURE — 99213 OFFICE O/P EST LOW 20 MIN: CPT | Performed by: NURSE PRACTITIONER

## 2023-06-11 RX ORDER — TOBRAMYCIN 3 MG/ML
2 SOLUTION/ DROPS OPHTHALMIC
Qty: 1 EACH | Refills: 0 | Status: SHIPPED | OUTPATIENT
Start: 2023-06-11 | End: 2023-06-16

## 2023-06-11 NOTE — ED INITIAL ASSESSMENT (HPI)
x2 days Pt c/o left eye itch, slight pain, watering, redness    Denies: photophobia, colored discharge, vision issues.

## 2023-07-07 ENCOUNTER — HOSPITAL ENCOUNTER (OUTPATIENT)
Age: 58
Discharge: HOME | End: 2023-07-07
Payer: COMMERCIAL

## 2023-07-07 VITALS
RESPIRATION RATE: 18 BRPM | TEMPERATURE: 98 F | BODY MASS INDEX: 30.12 KG/M2 | DIASTOLIC BLOOD PRESSURE: 78 MMHG | OXYGEN SATURATION: 96 % | HEART RATE: 80 BPM | WEIGHT: 170 LBS | SYSTOLIC BLOOD PRESSURE: 126 MMHG | HEIGHT: 63 IN

## 2023-07-07 DIAGNOSIS — G43.809 OTHER MIGRAINE WITHOUT STATUS MIGRAINOSUS, NOT INTRACTABLE: Primary | ICD-10-CM

## 2023-07-07 DIAGNOSIS — R09.82 PND (POST-NASAL DRIP): ICD-10-CM

## 2023-07-07 LAB — S PYO AG THROAT QL: NEGATIVE

## 2023-07-07 RX ORDER — SODIUM CHLORIDE 9 MG/ML
1000 INJECTION, SOLUTION INTRAVENOUS ONCE
Status: COMPLETED | OUTPATIENT
Start: 2023-07-07 | End: 2023-07-07

## 2023-07-07 RX ORDER — KETOROLAC TROMETHAMINE 30 MG/ML
30 INJECTION, SOLUTION INTRAMUSCULAR; INTRAVENOUS ONCE
Status: COMPLETED | OUTPATIENT
Start: 2023-07-07 | End: 2023-07-07

## 2023-07-07 RX ORDER — NAPROXEN 500 MG/1
500 TABLET ORAL 2 TIMES DAILY PRN
Qty: 14 TABLET | Refills: 0 | Status: SHIPPED | OUTPATIENT
Start: 2023-07-07 | End: 2023-07-14

## 2023-07-07 RX ORDER — ONDANSETRON 2 MG/ML
4 INJECTION INTRAMUSCULAR; INTRAVENOUS ONCE
Status: COMPLETED | OUTPATIENT
Start: 2023-07-07 | End: 2023-07-07

## 2023-07-07 NOTE — ED QUICK NOTES
Patient lying in exam room with music playing on her phone. States he headache is improving. Will continue to monitor.

## 2023-07-27 ENCOUNTER — OFFICE VISIT (OUTPATIENT)
Dept: NEUROLOGY | Facility: CLINIC | Age: 58
End: 2023-07-27
Payer: COMMERCIAL

## 2023-07-27 VITALS
WEIGHT: 170 LBS | SYSTOLIC BLOOD PRESSURE: 128 MMHG | BODY MASS INDEX: 30 KG/M2 | DIASTOLIC BLOOD PRESSURE: 62 MMHG | HEART RATE: 91 BPM | RESPIRATION RATE: 16 BRPM

## 2023-07-27 DIAGNOSIS — G40.909 SEIZURE DISORDER (HCC): Primary | ICD-10-CM

## 2023-07-27 PROCEDURE — 3074F SYST BP LT 130 MM HG: CPT | Performed by: OTHER

## 2023-07-27 PROCEDURE — 3078F DIAST BP <80 MM HG: CPT | Performed by: OTHER

## 2023-07-27 PROCEDURE — 99214 OFFICE O/P EST MOD 30 MIN: CPT | Performed by: OTHER

## 2023-07-27 RX ORDER — CARBAMAZEPINE 100 MG/1
TABLET, EXTENDED RELEASE ORAL
Qty: 270 TABLET | Refills: 3 | Status: SHIPPED | OUTPATIENT
Start: 2023-07-27

## 2023-07-27 RX ORDER — LORAZEPAM 0.5 MG/1
1 TABLET ORAL 2 TIMES DAILY PRN
COMMUNITY
Start: 2023-07-11

## 2023-08-04 ENCOUNTER — HOSPITAL ENCOUNTER (OUTPATIENT)
Age: 58
Discharge: HOME | End: 2023-08-04
Payer: COMMERCIAL

## 2023-08-04 VITALS
TEMPERATURE: 98 F | DIASTOLIC BLOOD PRESSURE: 77 MMHG | SYSTOLIC BLOOD PRESSURE: 109 MMHG | OXYGEN SATURATION: 97 % | HEART RATE: 98 BPM | RESPIRATION RATE: 18 BRPM

## 2023-08-04 DIAGNOSIS — R21 RASH: Primary | ICD-10-CM

## 2023-08-04 LAB — T PALLIDUM AB SER QL IA: NONREACTIVE

## 2023-08-04 PROCEDURE — 99213 OFFICE O/P EST LOW 20 MIN: CPT | Performed by: NURSE PRACTITIONER

## 2023-08-04 PROCEDURE — 86780 TREPONEMA PALLIDUM: CPT | Performed by: NURSE PRACTITIONER

## 2023-08-04 RX ORDER — PERMETHRIN 50 MG/G
1 CREAM TOPICAL ONCE
Qty: 1 EACH | Refills: 0 | Status: SHIPPED | OUTPATIENT
Start: 2023-08-04 | End: 2023-08-04

## 2023-08-04 NOTE — DISCHARGE INSTRUCTIONS
Use the steroid cream as directed after you use the cream application. Close follow-up with dermatology.

## 2023-10-06 ENCOUNTER — HOSPITAL ENCOUNTER (OUTPATIENT)
Age: 58
Discharge: HOME | End: 2023-10-06
Payer: COMMERCIAL

## 2023-10-06 VITALS
RESPIRATION RATE: 18 BRPM | SYSTOLIC BLOOD PRESSURE: 122 MMHG | BODY MASS INDEX: 30.3 KG/M2 | WEIGHT: 171 LBS | OXYGEN SATURATION: 99 % | TEMPERATURE: 98 F | HEIGHT: 63 IN | DIASTOLIC BLOOD PRESSURE: 84 MMHG | HEART RATE: 91 BPM

## 2023-10-06 DIAGNOSIS — J06.9 UPPER RESPIRATORY TRACT INFECTION, UNSPECIFIED TYPE: ICD-10-CM

## 2023-10-06 DIAGNOSIS — R09.81 NASAL CONGESTION: Primary | ICD-10-CM

## 2023-10-06 LAB
S PYO AG THROAT QL: NEGATIVE
SARS-COV-2 RNA RESP QL NAA+PROBE: NOT DETECTED

## 2023-10-06 RX ORDER — ALBUTEROL SULFATE 90 UG/1
2 AEROSOL, METERED RESPIRATORY (INHALATION) EVERY 4 HOURS PRN
Qty: 1 EACH | Refills: 0 | Status: SHIPPED | OUTPATIENT
Start: 2023-10-06 | End: 2023-11-05

## 2024-06-20 ENCOUNTER — APPOINTMENT (OUTPATIENT)
Dept: GENERAL RADIOLOGY | Age: 59
End: 2024-06-20
Attending: PHYSICIAN ASSISTANT

## 2024-06-20 ENCOUNTER — HOSPITAL ENCOUNTER (OUTPATIENT)
Age: 59
Discharge: HOME | End: 2024-06-20

## 2024-06-20 VITALS
TEMPERATURE: 97 F | WEIGHT: 169 LBS | RESPIRATION RATE: 16 BRPM | HEART RATE: 96 BPM | DIASTOLIC BLOOD PRESSURE: 79 MMHG | BODY MASS INDEX: 29.95 KG/M2 | OXYGEN SATURATION: 98 % | HEIGHT: 63 IN | SYSTOLIC BLOOD PRESSURE: 122 MMHG

## 2024-06-20 DIAGNOSIS — S93.402A SPRAIN OF LEFT ANKLE, UNSPECIFIED LIGAMENT, INITIAL ENCOUNTER: ICD-10-CM
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
## 2024-06-20 DIAGNOSIS — S43.402A SPRAIN OF LEFT SHOULDER, UNSPECIFIED SHOULDER SPRAIN TYPE, INITIAL ENCOUNTER: ICD-10-CM

## 2024-06-20 PROCEDURE — 73030 X-RAY EXAM OF SHOULDER: CPT | Performed by: PHYSICIAN ASSISTANT

## 2024-06-20 PROCEDURE — 73610 X-RAY EXAM OF ANKLE: CPT | Performed by: PHYSICIAN ASSISTANT

## 2024-06-20 PROCEDURE — 99214 OFFICE O/P EST MOD 30 MIN: CPT | Performed by: PHYSICIAN ASSISTANT

## 2024-06-20 PROCEDURE — 73130 X-RAY EXAM OF HAND: CPT | Performed by: PHYSICIAN ASSISTANT

## 2024-06-20 RX ORDER — NAPROXEN 500 MG/1
500 TABLET ORAL 2 TIMES DAILY PRN
Qty: 14 TABLET | Refills: 0 | Status: SHIPPED | OUTPATIENT
Start: 2024-06-20 | End: 2024-06-27

## 2024-06-20 RX ORDER — CYCLOBENZAPRINE HCL 10 MG
10 TABLET ORAL NIGHTLY PRN
Qty: 10 TABLET | Refills: 0 | Status: SHIPPED | OUTPATIENT
Start: 2024-06-20 | End: 2024-06-30
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
## 2025-02-24 ENCOUNTER — HOSPITAL ENCOUNTER (OUTPATIENT)
Age: 60
Discharge: HOME | End: 2025-02-24
Payer: COMMERCIAL

## 2025-02-24 VITALS
OXYGEN SATURATION: 95 % | TEMPERATURE: 99 F | DIASTOLIC BLOOD PRESSURE: 84 MMHG | SYSTOLIC BLOOD PRESSURE: 119 MMHG | HEART RATE: 94 BPM | RESPIRATION RATE: 18 BRPM

## 2025-02-24 DIAGNOSIS — J02.9 SORE THROAT: ICD-10-CM

## 2025-02-24 DIAGNOSIS — L28.2 PRURITIC RASH: Primary | ICD-10-CM

## 2025-02-24 LAB — S PYO AG THROAT QL: NEGATIVE

## 2025-02-24 PROCEDURE — 99214 OFFICE O/P EST MOD 30 MIN: CPT | Performed by: NURSE PRACTITIONER

## 2025-02-24 PROCEDURE — 87880 STREP A ASSAY W/OPTIC: CPT | Performed by: NURSE PRACTITIONER

## 2025-02-24 RX ORDER — HYDROXYZINE PAMOATE 25 MG/1
25 CAPSULE ORAL 3 TIMES DAILY PRN
Qty: 20 CAPSULE | Refills: 0 | Status: SHIPPED | OUTPATIENT
Start: 2025-02-24

## 2025-02-24 RX ORDER — METHYLPREDNISOLONE 4 MG/1
TABLET ORAL
Qty: 1 EACH | Refills: 0 | Status: SHIPPED | OUTPATIENT
Start: 2025-02-24

## 2025-02-24 RX ORDER — TRIAMCINOLONE ACETONIDE 1 MG/G
1 CREAM TOPICAL 2 TIMES DAILY
Qty: 15 G | Refills: 0 | Status: SHIPPED | OUTPATIENT
Start: 2025-02-24

## 2025-02-25 NOTE — ED PROVIDER NOTES
Patient Seen in: [facility]      History     Chief Complaint   Patient presents with    Sore Throat    Rash Skin Problem     Stated Complaint: Weakness Sore Throat Rash    Subjective:   HPI  Patient is a pleasant 59-year-old female with seizure disorder and rheumatoid arthritis here for evaluation of pruritic rash and sore throat.  Symptoms started about the same time, about 1 week ago.  Patient feels symptoms have been intermittent.    Rash intermittently for about 1 week.  Feels like it worsened over the past day.    No new soaps, laundry detergents, fabric softener.      Tried benadryl. Itching worsens at night.        Objective:     Past Medical History:    Anxiety    Depression    Gastric paresis    Pericarditis (HCC)    Pneumonia    Hospitalized for 1 week    RA (rheumatoid arthritis) (HCC)    Rheumatoid arthritis (HCC)    Seizure disorder (HCC)    1 episode. Pt sts due to low potassium.               Past Surgical History:   Procedure Laterality Date    Oophorectomy      Other  Mary 2019    \"Open window\" to drain fluid around heart.    Removal of kidney stone      Up gi endoscopy,remv tumor,forceps                  Social History     Socioeconomic History    Marital status:    Tobacco Use    Smoking status: Never    Smokeless tobacco: Never   Vaping Use    Vaping status: Never Used   Substance and Sexual Activity    Alcohol use: No     Alcohol/week: 0.0 standard drinks of alcohol     Comment: very little     Drug use: No   Other Topics Concern    Caffeine Concern Yes     Comment: occasional     Exercise Yes     Comment: walking, dancing, working out x3/week     Social Drivers of Health     Food Insecurity: Low Risk  (5/11/2023)    Received from [facility], [facility]    Food Insecurity     Have there been times that your food ran out, and you didn't have money to get more?: No     Are there times that you worry that this might happen?: No    Transportation Needs: Low Risk  (5/11/2023)    Received from [facility]    Transportation Needs     Do you have trouble getting transportation to medical appointments?: No   Housing Stability: High Risk (5/11/2023)    Received from [facility]    Housing Stability     Are you concerned about having a safe and reliable place to live?: Yes              Review of Systems    Positive for stated complaint: Weakness Sore Throat Rash  Other systems are as noted in HPI.  Constitutional and vital signs reviewed.      All other systems reviewed and negative except as noted above.    Physical Exam     ED Triage Vitals [02/24/25 1905]   /84   Pulse 94   Resp 18   Temp 98.7 °F (37.1 °C)   Temp src Oral   SpO2 95 %   O2 Device None (Room air)       Current Vitals:   Vital Signs  BP: 119/84  Pulse: 94  Resp: 18  Temp: 98.7 °F (37.1 °C)  Temp src: Oral    Oxygen Therapy  SpO2: 95 %  O2 Device: None (Room air)        Physical Exam  Vitals and nursing note reviewed.   Constitutional:       General: She is not in acute distress.     Appearance: She is well-developed. She is not ill-appearing, toxic-appearing or diaphoretic.   HENT:      Nose: No congestion.      Mouth/Throat:      Mouth: Mucous membranes are moist. No oral lesions.      Pharynx: Uvula midline. Pharyngeal swelling and posterior oropharyngeal erythema present. No oropharyngeal exudate or uvula swelling.      Tonsils: No tonsillar exudate or tonsillar abscesses.   Eyes:      Conjunctiva/sclera: Conjunctivae normal.      Pupils: Pupils are equal, round, and reactive to light.   Cardiovascular:      Rate and Rhythm: Normal rate and regular rhythm.   Lymphadenopathy:      Cervical: No cervical adenopathy.   Skin:     Comments: Erythemic pruritic areas on left anterior neck, chin and bilateral cheeks.   Neurological:      Mental Status: She is alert.            ED Course     Labs Reviewed   POCT RAPID STREP - Normal                 MDM              Medical Decision Making  Differentials include but are not limited to urticaria, viral urticaria, contact dermatitis and strep.  Negative rapid strep testing here.  Patient is well-appearing, afebrile with no visible exudate or lymphadenopathy.  Will start Medrol Dosepak in the morning, encouraged use of nondrowsy antihistamine during the day, Vistaril at bedtime and topical triamcinolone.  Close outpatient follow-up with dermatology for recurrent rashes.  Patient agrees with plan of care.  All questions answered to patient satisfaction    Amount and/or Complexity of Data Reviewed  Labs: ordered. Decision-making details documented in ED Course.        Disposition and Plan     Clinical Impression:  1. Pruritic rash    2. Sore throat         Disposition:  Discharge  2/24/2025  7:25 pm    Follow-up:  Nonstaff, Physician      As needed    [address] [id number] [phone]  Schedule an appointment as soon as possible for a visit   for intermittent rashes          Medications Prescribed:  Discharge Medication List as of 2/24/2025  7:35 PM        START taking these medications    Details   triamcinolone 0.1 % External Cream Apply 1 each topically 2 (two) times daily., Normal, Disp-15 g, R-0      hydrOXYzine Pamoate 25 MG Oral Cap Take 1 capsule (25 mg total) by mouth 3 (three) times daily as needed for Itching (at bedtime)., Normal, Disp-20 capsule, R-0      methylPREDNISolone (MEDROL) 4 MG Oral Tablet Therapy Pack Dosepack: take as directed, Normal, Disp-1 each, R-0                 Supplementary Documentation:

## 2025-05-05 ENCOUNTER — HOSPITAL ENCOUNTER (OUTPATIENT)
Age: 60
Discharge: HOME | End: 2025-05-05
Payer: COMMERCIAL

## 2025-05-05 VITALS
DIASTOLIC BLOOD PRESSURE: 60 MMHG | SYSTOLIC BLOOD PRESSURE: 98 MMHG | WEIGHT: 160 LBS | TEMPERATURE: 98 F | BODY MASS INDEX: 28 KG/M2 | OXYGEN SATURATION: 99 % | HEART RATE: 89 BPM | RESPIRATION RATE: 18 BRPM

## 2025-05-05 DIAGNOSIS — U07.1 COVID-19: Primary | ICD-10-CM

## 2025-05-05 LAB
POCT INFLUENZA A: NEGATIVE
POCT INFLUENZA B: NEGATIVE
S PYO AG THROAT QL: NEGATIVE
SARS-COV-2 RNA RESP QL NAA+PROBE: DETECTED

## 2025-05-05 PROCEDURE — 87880 STREP A ASSAY W/OPTIC: CPT | Performed by: NURSE PRACTITIONER

## 2025-05-05 PROCEDURE — 99214 OFFICE O/P EST MOD 30 MIN: CPT | Performed by: NURSE PRACTITIONER

## 2025-05-05 PROCEDURE — 87502 INFLUENZA DNA AMP PROBE: CPT | Performed by: NURSE PRACTITIONER

## 2025-05-05 PROCEDURE — U0002 COVID-19 LAB TEST NON-CDC: HCPCS | Performed by: NURSE PRACTITIONER

## 2025-05-05 NOTE — ED INITIAL ASSESSMENT (HPI)
Patient here with c/o feeling sick since Friday. Symptoms include sore throat, congestion and cough. No recent fever, chills or body aches.

## 2025-05-05 NOTE — ED PROVIDER NOTES
Patient Seen in: [facility]      History     Chief Complaint   Patient presents with    Ear Problem     Stated Complaint: Earache; Cough; Sniffling    Subjective:   59-year-old female presents today with URI symptoms ear pain and cough.  Symptoms over the last 4 to 5 days.  Denies any fever chills.  No body exstrophy.  Alert oriented x 3.  No other symptoms or concerns.  The patient's medication list, past medical history and social history elements as listed in today's nurse's notes were reviewed and agreed (except as otherwise stated in the HPI).  The patient's family history reviewed and determined to be noncontributory to the presenting problem          History of Present Illness               Objective:     Past Medical History:    Anxiety    Depression    Gastric paresis    Pericarditis (HCC)    Pneumonia    Hospitalized for 1 week    RA (rheumatoid arthritis) (HCC)    Rheumatoid arthritis (HCC)    Seizure disorder (HCC)    1 episode. Pt sts due to low potassium.               Past Surgical History:   Procedure Laterality Date    Oophorectomy      Other  Mary 2019    \"Open window\" to drain fluid around heart.    Removal of kidney stone      Up gi endoscopy,remv tumor,forceps                  No pertinent social history.            Review of Systems    Positive for stated complaint: Earache; Cough; Sniffling  Other systems are as noted in HPI.  Constitutional and vital signs reviewed.      All other systems reviewed and negative except as noted above.                  Physical Exam     ED Triage Vitals [05/05/25 1437]   BP 98/60   Pulse 89   Resp 18   Temp 98.2 °F (36.8 °C)   Temp src Oral   SpO2 99 %   O2 Device None (Room air)       Current Vitals:   Vital Signs  BP: 98/60  Pulse: 89  Resp: 18  Temp: 98.2 °F (36.8 °C)  Temp src: Oral    Oxygen Therapy  SpO2: 99 %  O2 Device: None (Room air)        Physical Exam  Vitals and nursing note reviewed.   Constitutional:       Appearance: She is  well-developed.   HENT:      Head: Normocephalic.      Right Ear: Tympanic membrane and ear canal normal.      Left Ear: Tympanic membrane and ear canal normal.      Nose: Mucosal edema and congestion present.      Mouth/Throat:      Pharynx: Uvula midline. Posterior oropharyngeal erythema present.   Eyes:      Conjunctiva/sclera: Conjunctivae normal.      Pupils: Pupils are equal, round, and reactive to light.   Cardiovascular:      Rate and Rhythm: Normal rate and regular rhythm.   Pulmonary:      Effort: Pulmonary effort is normal.      Breath sounds: Normal breath sounds.   Musculoskeletal:      Cervical back: Normal range of motion and neck supple.   Skin:     General: Skin is warm and dry.   Neurological:      Mental Status: She is alert and oriented to person, place, and time.           Physical Exam                ED Course     Labs Reviewed   RAPID SARS-COV-2 BY PCR - Abnormal; Notable for the following components:       Result Value    Rapid SARS-CoV-2 by PCR Detected (*)     All other components within normal limits   POCT RAPID STREP - Normal   POCT FLU TEST - Normal    Narrative:     This assay is a rapid molecular in vitro test utilizing nucleic acid amplification of influenza A and B viral RNA.          Results                           MDM     Please note that this report has been produced using speech recognition software and may contain errors related to that system including, but not limited to, errors in grammar, punctuation, and spelling, as well as words and phrases that possibly may have been recognized inappropriately.  If there are any questions or concerns, contact the dictating provider for clarification.              Medical Decision Making  Differential diagnosis includes but is not limited to: COVID-19, viral URI, strep throat, influenza, pneumonia, sinusitis, bronchitis      Presented today with right symptoms cough and sore throat.  Rapid strep and flu testing were done and negative..   COVID-19 testing was done and was positive.  To remain quarantined symptoms started to improve, if anytime develops a fever must be fever free for 24 hours with symptoms improving before leaving quarantine.  Encouraged to push fluids rest.  To take Tylenol for any fever pain.  Take over-the-counter antihistamine cough suppressant as needed.   To follow-up with primary MD 14 days after quarantine ends for reevaluation.  Patient verbalized understanding agree with plan of care.      Amount and/or Complexity of Data Reviewed  Labs: ordered. Decision-making details documented in ED Course.     Details: COVID-19  Influenza  Strep    Risk  OTC drugs.        Disposition and Plan     Clinical Impression:  1. COVID-19         Disposition:  Discharge  5/5/2025  3:11 pm    Follow-up:  Nonstaff, Physician    In 1 week  As needed          Medications Prescribed:  Discharge Medication List as of 5/5/2025  3:13 PM          Supplementary Documentation:

## 2025-05-14 ENCOUNTER — HOSPITAL ENCOUNTER (OUTPATIENT)
Age: 60
Discharge: HOME | End: 2025-05-14
Payer: COMMERCIAL

## 2025-05-14 VITALS
DIASTOLIC BLOOD PRESSURE: 82 MMHG | TEMPERATURE: 98 F | RESPIRATION RATE: 16 BRPM | SYSTOLIC BLOOD PRESSURE: 116 MMHG | HEIGHT: 63 IN | OXYGEN SATURATION: 99 % | WEIGHT: 106 LBS | BODY MASS INDEX: 18.78 KG/M2 | HEART RATE: 91 BPM

## 2025-05-14 DIAGNOSIS — Z76.89 RETURN TO WORK EVALUATION: Primary | ICD-10-CM

## 2025-05-14 LAB — SARS-COV-2 RNA RESP QL NAA+PROBE: NOT DETECTED

## 2025-05-14 PROCEDURE — 99212 OFFICE O/P EST SF 10 MIN: CPT | Performed by: NURSE PRACTITIONER

## 2025-05-14 PROCEDURE — U0002 COVID-19 LAB TEST NON-CDC: HCPCS | Performed by: NURSE PRACTITIONER

## 2025-05-14 NOTE — ED INITIAL ASSESSMENT (HPI)
Patient requesting CoVid test. Reports being positive 5/5. Had been feeling better when she developed symptoms again yesterday. 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

## 2025-05-14 NOTE — DISCHARGE INSTRUCTIONS
Continue take over-the-counter antihistamine for congestion runny nose.  Wear mask when around your patient's.  Follow-up with your primary care physician as needed.  COVID testing was negative, congestion could be allergies or a new virus.

## 2025-05-14 NOTE — ED PROVIDER NOTES
Patient Seen in: [facility]      History     Chief Complaint   Patient presents with    Cough/URI     Stated Complaint: Sinus Issues    Subjective:   59-year-old female presents today with history of COVID-19 was diagnosed on 5 May.  Still continues to have URI symptoms but otherwise feeling much better.  Here today for repeat COVID testing to ensure that COVID has resolved.  Denies any shortness of breath or wheezing.  No other symptoms or concerns.  The patient's medication list, past medical history and social history elements as listed in today's nurse's notes were reviewed and agreed (except as otherwise stated in the HPI).  The patient's family history reviewed and determined to be noncontributory to the presenting problem      History of Present Illness                  Objective:     Past Medical History:    Anxiety    Depression    Gastric paresis    Pericarditis (HCC)    Pneumonia    Hospitalized for 1 week    RA (rheumatoid arthritis) (HCC)    Rheumatoid arthritis (HCC)    Seizure disorder (HCC)    1 episode. Pt sts due to low potassium.               Past Surgical History:   Procedure Laterality Date    Oophorectomy      Other  Mary 2019    \"Open window\" to drain fluid around heart.    Removal of kidney stone      Up gi endoscopy,remv tumor,forceps                  Social History     Socioeconomic History    Marital status:    Tobacco Use    Smoking status: Never    Smokeless tobacco: Never   Vaping Use    Vaping status: Never Used   Substance and Sexual Activity    Alcohol use: No     Alcohol/week: 0.0 standard drinks of alcohol     Comment: very little     Drug use: No   Other Topics Concern    Caffeine Concern Yes     Comment: occasional     Exercise Yes     Comment: walking, dancing, working out x3/week     Social Drivers of Health     Food Insecurity: Low Risk  (5/11/2023)    Received from [facility]    Food Insecurity     Have there been times that your  food ran out, and you didn't have money to get more?: No     Are there times that you worry that this might happen?: No   Transportation Needs: Low Risk  (5/11/2023)    Received from [facility]    Transportation Needs     Do you have trouble getting transportation to medical appointments?: No   Housing Stability: High Risk (5/11/2023)    Received from [facility]    Housing Stability     Are you concerned about having a safe and reliable place to live?: Yes              Review of Systems    Positive for stated complaint: Sinus Issues  Other systems are as noted in HPI.  Constitutional and vital signs reviewed.      All other systems reviewed and negative except as noted above.                  Physical Exam     ED Triage Vitals [05/14/25 0812]   /82   Pulse 91   Resp 16   Temp 98.2 °F (36.8 °C)   Temp src Oral   SpO2 99 %   O2 Device None (Room air)       Current Vitals:   Vital Signs  BP: 116/82  Pulse: 91  Resp: 16  Temp: 98.2 °F (36.8 °C)  Temp src: Oral    Oxygen Therapy  SpO2: 99 %  O2 Device: None (Room air)          Physical Exam  Vitals and nursing note reviewed.   Constitutional:       Appearance: Normal appearance.   HENT:      Head: Normocephalic.      Nose: Congestion present.      Mouth/Throat:      Mouth: Mucous membranes are moist.   Eyes:      Pupils: Pupils are equal, round, and reactive to light.   Cardiovascular:      Rate and Rhythm: Normal rate.   Pulmonary:      Effort: Pulmonary effort is normal.      Breath sounds: Normal breath sounds.   Musculoskeletal:      Cervical back: Normal range of motion and neck supple.   Skin:     General: Skin is warm and dry.   Neurological:      Mental Status: She is alert and oriented to person, place, and time.                   ED Course     Labs Reviewed   RAPID SARS-COV-2 BY PCR - Normal          Results                                MDM   Please note that this report has been produced using speech  recognition software and may contain errors related to that system including, but not limited to, errors in grammar, punctuation, and spelling, as well as words and phrases that possibly may have been recognized inappropriately.  If there are any questions or concerns, contact the dictating provider for clarification.              Medical Decision Making  Differential diagnosis includes but is not limited to: COVID-19, viral URI, strep throat, influenza, pneumonia, sinusitis, bronchitis        Presented today with ongoing URI symptoms however symptoms have improved.  States that he wanted repeat COVID test to return to work.  COVID testing was done and negative.  Explained to patient could be secondary to viral URI versus possible allergies.  To wear mask if having to go to work.  Otherwise take over-the-counter antihistamine for symptoms.  Patient verbalized understanding and agreed to plan of care.    Amount and/or Complexity of Data Reviewed  Labs: ordered. Decision-making details documented in ED Course.     Details:  COVID-19    Risk  OTC drugs.          Disposition and Plan     Clinical Impression:  1. Return to work evaluation         Disposition:  Discharge  5/14/2025  8:33 am    Follow-up:  Nonstaff, Physician      As needed          Medications Prescribed:  Current Discharge Medication List                Supplementary Documentation:

## 2025-07-11 ENCOUNTER — HOSPITAL ENCOUNTER (OUTPATIENT)
Age: 60
Discharge: HOME | End: 2025-07-11
Payer: COMMERCIAL

## 2025-07-11 ENCOUNTER — APPOINTMENT (OUTPATIENT)
Dept: GENERAL RADIOLOGY | Age: 60
End: 2025-07-11
Attending: NURSE PRACTITIONER
Payer: COMMERCIAL

## 2025-07-11 VITALS
SYSTOLIC BLOOD PRESSURE: 118 MMHG | RESPIRATION RATE: 18 BRPM | OXYGEN SATURATION: 98 % | HEART RATE: 90 BPM | DIASTOLIC BLOOD PRESSURE: 72 MMHG | TEMPERATURE: 99 F

## 2025-07-11 DIAGNOSIS — M25.562 PAIN AND SWELLING OF LEFT KNEE: Primary | ICD-10-CM

## 2025-07-11 DIAGNOSIS — M25.462 PAIN AND SWELLING OF LEFT KNEE: Primary | ICD-10-CM

## 2025-07-11 PROCEDURE — 99214 OFFICE O/P EST MOD 30 MIN: CPT | Performed by: NURSE PRACTITIONER

## 2025-07-11 PROCEDURE — A6449 LT COMPRES BAND >=3" <5"/YD: HCPCS | Performed by: NURSE PRACTITIONER

## 2025-07-11 PROCEDURE — 73560 X-RAY EXAM OF KNEE 1 OR 2: CPT | Performed by: NURSE PRACTITIONER

## 2025-07-11 RX ORDER — PREDNISONE 20 MG/1
40 TABLET ORAL DAILY
Qty: 10 TABLET | Refills: 0 | Status: SHIPPED | OUTPATIENT
Start: 2025-07-11 | End: 2025-07-16

## 2025-07-11 NOTE — ED PROVIDER NOTES
Patient Seen in: [facility]        History  Chief Complaint   Patient presents with    Knee Pain     Stated Complaint: left knee pain    Subjective:   59-year-old female, who presents with nontraumatic pain of the left knee as well as swelling.  States it has been there for 2 days.  States he is also get a shot in her knee in a couple weeks.                      Objective:     Past Medical History:    Anxiety    Depression    Gastric paresis    Pericarditis (HCC)    Pneumonia    Hospitalized for 1 week    RA (rheumatoid arthritis) (HCC)    Rheumatoid arthritis (HCC)    Seizure disorder (HCC)    1 episode. Pt sts due to low potassium.               Past Surgical History:   Procedure Laterality Date    Oophorectomy      Other  Mary 2019    \"Open window\" to drain fluid around heart.    Removal of kidney stone      Up gi endoscopy,remv tumor,forceps                  Social History     Socioeconomic History    Marital status:    Tobacco Use    Smoking status: Never    Smokeless tobacco: Never   Vaping Use    Vaping status: Never Used   Substance and Sexual Activity    Alcohol use: No     Alcohol/week: 0.0 standard drinks of alcohol     Comment: very little     Drug use: No   Other Topics Concern    Caffeine Concern Yes     Comment: occasional     Exercise Yes     Comment: walking, dancing, working out x3/week     Social Drivers of Health     Food Insecurity: Low Risk  (5/11/2023)    Received from [facility]    Food Insecurity     Have there been times that your food ran out, and you didn't have money to get more?: No     Are there times that you worry that this might happen?: No   Transportation Needs: Low Risk  (5/11/2023)    Received from [facility]    Transportation Needs     Do you have trouble getting transportation to medical appointments?: No   Housing Stability: High Risk (5/11/2023)    Received from [facility]    Housing  Stability     Are you concerned about having a safe and reliable place to live?: Yes              Review of Systems   Constitutional: Negative.    Musculoskeletal:         Left knee pain and swelling   All other systems reviewed and are negative.      Positive for stated complaint: left knee pain  Other systems are as noted in HPI.  Constitutional and vital signs reviewed.      All other systems reviewed and negative except as noted above.                  Physical Exam    ED Triage Vitals [07/11/25 1811]   /72   Pulse 90   Resp 18   Temp 98.8 °F (37.1 °C)   Temp src Oral   SpO2 98 %   O2 Device None (Room air)       Current Vitals:   Vital Signs  BP: 118/72  Pulse: 90  Resp: 18  Temp: 98.8 °F (37.1 °C)  Temp src: Oral    Oxygen Therapy  SpO2: 98 %  O2 Device: None (Room air)            Physical Exam  Vitals and nursing note reviewed.   Constitutional:       General: She is not in acute distress.     Appearance: Normal appearance. She is not ill-appearing, toxic-appearing or diaphoretic.   Musculoskeletal:      Left knee: Swelling present. No deformity or bony tenderness. Normal range of motion. Tenderness present over the patellar tendon.      Left lower leg: Normal.   Skin:     General: Skin is warm and dry.      Capillary Refill: Capillary refill takes less than 2 seconds.      Coloration: Skin is not jaundiced or pale.   Neurological:      Mental Status: She is alert and oriented to person, place, and time.   Psychiatric:         Behavior: Behavior normal.                 ED Course  Labs Reviewed - No data to display  XR KNEE (1 OR 2 VIEWS), LEFT (CPT=73560)  Result Date: 7/11/2025  PROCEDURE: XR KNEE (1 OR 2 VIEWS), LEFT (CPT=73560) INDICATIONS: left knee pain PATIENT STATED HISTORY: Left knee pain and swelling for two days. No injury or trauma. Patient has a history of RA. COMPARISON: No comparisons. FINDINGS: No fracture or dislocation. Marked medial joint space narrowing. Tricompartmental marginal  osteophytes.     CONCLUSION: Tricompartmental osteoarthritic changes, greatest medially. Electronically Verified and Signed by Attending Radiologist: Rudy Rahman MD 7/11/2025 6:51 PM Workstation: [id number]                           [facility]             Medical Decision Making  Differential diagnosis initially included but was not limited to: Knee effusion, sprain, fracture    Nontoxic 9-year-old female with nontraumatic pain of the left knee.  No vascular deficits.    I personally viewed, independently interpreted and radiology report was reviewed.  Fracture, no specific findings for an effusion.  Osteoarthritic changes.  Will provide with Ace wrap.  Try a course of prednisone.  She will follow-up with her orthopedist.    Supportive/home management of diagnosis/illness/injury discussed. Patient and/or responsible adult verbalize and agree with management and plan of care.    Speech recognition software was used during this dictation.  There may be minor errors in transcription.      Amount and/or Complexity of Data Reviewed  Radiology: ordered and independent interpretation performed. Decision-making details documented in ED Course.        Disposition and Plan     Clinical Impression:  1. Pain and swelling of left knee         Disposition:  There is no disposition on file for this visit.  There is no disposition time on file for this visit.    Follow-up:  No follow-up provider specified.        Medications Prescribed:  Current Discharge Medication List                Supplementary Documentation:

## 2025-07-12 NOTE — DISCHARGE INSTRUCTIONS
Elevate the left leg when you can.  Ice intermittently 10 minutes at a time a few times a day.  Take the prednisone as prescribed.  Over-the-counter Tylenol/ibuprofen as needed for pain.  Follow-up with your orthopedist

## (undated) NOTE — LETTER
Date & Time: 7/31/2022, 11:54 AM  Patient: Ebony Regalado  Encounter Provider(s):    VIRA Ortega       To Whom It May Concern:    Ember Alvarez was seen and treated in our department on 7/31/2022. She may return to work 8/2/2022.     If you have any questions or concerns, please do not hesitate to call.        _____________________________  Physician/APC Signature

## (undated) NOTE — LETTER
Date & Time: 8/28/2019, 2:59 PM  Patient: Shari Nayan  Encounter Provider(s):    Yani Cantrell MD       To Whom It May Concern:    Chris Szymanski was seen and treated in our department on 8/28/2019.     If you have any questions or conc

## (undated) NOTE — LETTER
Date & Time: 10/18/2021, 3:57 PM  Patient: Dottie Sanchez  Encounter Provider(s):    VIRA Mock       To Whom It May Concern:    Teddy Emerson was seen and treated in our department on 10/18/2021.  She should not return to work until 10/

## (undated) NOTE — Clinical Note
Dear colleague,    Thank you very much for the opportunity to see your patient. Below, please find information from my consult for your patient's recent visit. I appreciate the chance to take care of your patient with you.   Please feel free to call me

## (undated) NOTE — MR AVS SNAPSHOT
[facility], [address], [address] [place] [id number] [phone]               Thank you for choosing us for your health care visit with KASSANDRA Hernandez.   We are glad to serve you and happy to provide you with The [employer] information, go to [email or web address] and click on the Sign Up Now link in the [employer] box. Enter your [employer] Activation Code exactly as it appears below along with your Zip Code and Date of Birth to complete the sign-up process.  If you do You don’t need to join a gym. Home exercises work great.  Put more priority on exercise in your life                    Visit [facility] online at  [email or web address].tn

## (undated) NOTE — Clinical Note
Date: 3/8/2017    Patient Name: German Shrestha        To Whom it may concern: This letter has been written at the patient's request. The above patient was seen at the [facility] for treatment of a medical condition.       The patient is

## (undated) NOTE — LETTER
Date & Time: 6/20/2024, 2:40 PM  Patient: Michele A Abadie  Encounter Provider(s):    Kacie Rowe PA       To Whom It May Concern:    Michele Abadie was seen and treated in our department on 6/20/2024. She can return to work 6/22/24.    If you have any questions or concerns, please do not hesitate to call.        _____________________________  Physician/APC Signature

## (undated) NOTE — LETTER
To Whom It May Concern:    Arley Flores is currently under my medical care. 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 Sincerely,      Vu Ziegler MD  [facility], [address] [place] [address] [phone]        Document generated by:   Vu Ziegler MD

## (undated) NOTE — Clinical Note
Date: 3/8/2017    Patient Name: Kyrie Mcneal          To Whom it may concern: This letter has been written at the patient's request. The above patient was seen at the [facility] for treatment of a medical condition.     The patient is c

## (undated) NOTE — MR AVS SNAPSHOT
[address] [id number]               Thank you for choosing us for your health care visit with Craig Umana MD.  We are glad to serve you and happy to provide you with this ? To best provide you care, patients receiving routine medications need to be seen at least once a year.  protocol for controlled substances:  Written prescriptions    ? Written prescriptions must be picked up in office. ?  Please allow the offic · You will have 23 electrodes placed on your head for the EEG and 2 on your chest for an EKG tracing. · Head will be measured using a washable grease pencil. · Head will be prepped with a mild abrasive for good conductivity.   · Electrodes are applied wit Commonly known as:  MOTRIN           topiramate 50 MG Tabs   TAKE 1 TABLET BY MOUTH DAILY   Commonly known as:  TOPAMAX           Vitamin D 2000 units Caps   Take  by mouth.                    MyChart     Sign up for [employer], your secure online medical dav

## (undated) NOTE — LETTER
[facility] [place]  [address] [id number]  Dept: [phone]  Dept Fax: [phone]         April 26, 2019    Patient: Ron Barrera   YOB: 1965   Date of Visit: 4/26/2019       To Whom It May Concern:    Mi